# Patient Record
Sex: FEMALE | Race: WHITE | NOT HISPANIC OR LATINO
[De-identification: names, ages, dates, MRNs, and addresses within clinical notes are randomized per-mention and may not be internally consistent; named-entity substitution may affect disease eponyms.]

---

## 2021-06-02 ENCOUNTER — APPOINTMENT (OUTPATIENT)
Dept: ORTHOPEDIC SURGERY | Facility: CLINIC | Age: 21
End: 2021-06-02
Payer: COMMERCIAL

## 2021-06-02 VITALS
HEART RATE: 77 BPM | SYSTOLIC BLOOD PRESSURE: 126 MMHG | BODY MASS INDEX: 21.97 KG/M2 | DIASTOLIC BLOOD PRESSURE: 79 MMHG | HEIGHT: 63 IN | TEMPERATURE: 97.6 F | WEIGHT: 124 LBS

## 2021-06-02 DIAGNOSIS — Z78.9 OTHER SPECIFIED HEALTH STATUS: ICD-10-CM

## 2021-06-02 DIAGNOSIS — Z82.61 FAMILY HISTORY OF ARTHRITIS: ICD-10-CM

## 2021-06-02 PROCEDURE — 99072 ADDL SUPL MATRL&STAF TM PHE: CPT

## 2021-06-02 PROCEDURE — 73140 X-RAY EXAM OF FINGER(S): CPT | Mod: LT

## 2021-06-02 PROCEDURE — 99204 OFFICE O/P NEW MOD 45 MIN: CPT

## 2021-06-02 NOTE — HISTORY OF PRESENT ILLNESS
[Right] : right hand dominant [FreeTextEntry1] : She comes in today for evaluation of a left little finger fracture, which occurred 6 days ago. She jammed the finger into another player while playing basketball. She saw Dr. Hill who obtained x-rays and diagnosed her with a fracture. She was fitted with a splint, which she has removed to ice the hand. She also had an MRI which showed evidence of an enchondroma. She is having some pain and states that symptoms are worse at night after using the hand throughout the day. She rates her pain a 6 out of 10 at this time.\par \par She is accompanied by her parents today.\par \par She was referred by Dr. Mathieu Hill.

## 2021-06-02 NOTE — PHYSICAL EXAM
[de-identified] : - Constitutional: This is a female in no obvious distress.  She is accompanied by her parents today.\par - Psych: Patient is alert and oriented to person, place and time.  Patient has a normal mood and affect.\par - Cardiovascular: Normal pulses throughout the upper extremities.  No significant varicosities are noted in the upper extremities. \par - Neuro: Strength and sensation are intact throughout the upper extremities.  Patient has normal coordination.\par - Respiratory:  Patient exhibits no evidence of shortness of breath or difficulty breathing.\par - Skin: No rashes, lesions, or other abnormalities are noted in the upper extremities.\par \par ---\par  \par Examination of her left little finger after the splint was removed demonstrates swelling, tenderness and ecchymosis along the proximal phalanx.  There is limitation of motion.  There is no obvious rotational deformity.  She is neurovascularly intact distally. [de-identified] : I reviewed the MRI of her left hand from 6/1/2021.  This demonstrated what appears to be an enchondroma involving the proximal phalanx of the little finger as well as a pathologic fracture with displacement.  The case\par \par AP, lateral, and oblique radiographs of the left little finger demonstrate expansile lesion within the proximal phalanx possible one half consistent with a probable enchondroma.  There is a pathologic fracture with extension and ulnar deviation.

## 2021-06-02 NOTE — END OF VISIT
[FreeTextEntry3] : This note was written by aJnet Escudero on 06/02/2021 acting solely as a scribe for Dr. Danyel Elias.\par  \par All medical record entries made by the Scribe were at my, Dr. Danyel Elias, direction and personally dictated by me on 06/02/2021. I have personally reviewed the chart and agree that the record accurately reflects my personal performance of the history, physical exam, assessment and plan.

## 2021-06-02 NOTE — DISCUSSION/SUMMARY
[FreeTextEntry1] : She has findings consistent with what appears to be a left little finger proximal phalanx enchondroma with an associated pathologic fracture.\par \par I had a discussion regarding today's visit, the prognosis of this diagnosis, and treatment recommendations and options. At this time, I have recommended surgery.  This will consist of curettage and packing of the enchondroma with cancellous chips as well as surgical pinning of the fracture.\par \par -  The nature and purposes of the operation/procedure was discussed in detail.  I discussed the surgical procedure in detail, as well as expected postoperative recovery and outcome.\par -  Possible risks, benefits, and complications (from known and unknown causes) of the procedure were discussed in detail.  \par -  Possible non-operative alternatives to the proposed treatment were discussed in detail.  \par -  She she and her parents were told that possible risks/complications include, but are not limited to:  Infection, nerve or vessel injury, stiffness, painful scar, poor outcome, need for additional surgical procedures, and other unforeseen complications.  \par -  In addition, the possibility of an ""unsuccessful outcome,"" despite ""successful surgery,"" was discussed with she and her parents.  They understand that I cannot be certain as to the pathology he She comes in today for evaluation of this comes back, but based upon the MRI and x-rays, this appears to be an enchondroma.  They also understand the potential chance of recurrence in the future.  Finally, as there is a pathologic fracture that is displaced and requires surgical fixation, she is at risk for stiffness, which could be permanent.  I also discussed the chance of failure of the hardware as well as pin tract infection.\par -  The patient and her parents fully understand these risks and wishes to proceed.  \par -  I had a lengthy discussion with the patient and her parents regarding today's visit, the diagnosis, and my surgical treatment recommendations.  The patient has agreed to this plan of management and has expressed full understanding.  All questions were fully answered to their satisfaction. \par \par My cumulative time spent on this patient's visit included: Preparation for the visit, review of the medical records, review of pertinent diagnostic studies, examination and counseling of the patient on the above diagnosis, treatment plan and prognosis, orders of diagnostic tests, medication and/or appropriate procedures and documentation in the medical records of today's visit.

## 2021-06-02 NOTE — CONSULT LETTER
[Dear  ___] : Dear  [unfilled], [Consult Letter:] : I had the pleasure of evaluating your patient, [unfilled]. [Please see my note below.] : Please see my note below. [Consult Closing:] : Thank you very much for allowing me to participate in the care of this patient.  If you have any questions, please do not hesitate to contact me. [Sincerely,] : Sincerely, [FreeTextEntry3] : Danyel Elias M.D.\par Surgery of the Hand & Upper Extremity\par Orthopaedic Surgery\par Chief, Hand Service, [facility]\par Director, Hand Service, [facility]\par  of Orthopedic Surgery, [facility] [employer] [facility] at [facility] \par [facility] Email: [email or web address] Office Phone: [phone]

## 2021-06-02 NOTE — ADDENDUM
[FreeTextEntry1] : I, Janet Escudero, acted solely as a scribe for Dr. Elias on this date 06/02/2021.

## 2021-06-04 ENCOUNTER — OUTPATIENT (OUTPATIENT)
Dept: OUTPATIENT SERVICES | Facility: HOSPITAL | Age: 21
LOS: 1 days | End: 2021-06-04
Payer: COMMERCIAL

## 2021-06-04 VITALS
WEIGHT: 126.99 LBS | TEMPERATURE: 98 F | HEART RATE: 86 BPM | SYSTOLIC BLOOD PRESSURE: 109 MMHG | DIASTOLIC BLOOD PRESSURE: 74 MMHG | HEIGHT: 64 IN | OXYGEN SATURATION: 99 % | RESPIRATION RATE: 14 BRPM

## 2021-06-04 DIAGNOSIS — D16.12 BENIGN NEOPLASM OF SHORT BONES OF LEFT UPPER LIMB: ICD-10-CM

## 2021-06-04 DIAGNOSIS — S62.619A DISPLACED FRACTURE OF PROXIMAL PHALANX OF UNSPECIFIED FINGER, INITIAL ENCOUNTER FOR CLOSED FRACTURE: ICD-10-CM

## 2021-06-04 DIAGNOSIS — Z01.818 ENCOUNTER FOR OTHER PREPROCEDURAL EXAMINATION: ICD-10-CM

## 2021-06-04 LAB
HCG SERPL-ACNC: <1 MIU/ML — SIGNIFICANT CHANGE UP
HCT VFR BLD CALC: 41.8 % — SIGNIFICANT CHANGE UP (ref 34.5–45)
HGB BLD-MCNC: 13.8 G/DL — SIGNIFICANT CHANGE UP (ref 11.5–15.5)
MCHC RBC-ENTMCNC: 28.4 PG — SIGNIFICANT CHANGE UP (ref 27–34)
MCHC RBC-ENTMCNC: 33 GM/DL — SIGNIFICANT CHANGE UP (ref 32–36)
MCV RBC AUTO: 86 FL — SIGNIFICANT CHANGE UP (ref 80–100)
NRBC # BLD: 0 /100 WBCS — SIGNIFICANT CHANGE UP (ref 0–0)
PLATELET # BLD AUTO: 207 K/UL — SIGNIFICANT CHANGE UP (ref 150–400)
RBC # BLD: 4.86 M/UL — SIGNIFICANT CHANGE UP (ref 3.8–5.2)
RBC # FLD: 12.5 % — SIGNIFICANT CHANGE UP (ref 10.3–14.5)
WBC # BLD: 7.66 K/UL — SIGNIFICANT CHANGE UP (ref 3.8–10.5)
WBC # FLD AUTO: 7.66 K/UL — SIGNIFICANT CHANGE UP (ref 3.8–10.5)

## 2021-06-04 PROCEDURE — 36415 COLL VENOUS BLD VENIPUNCTURE: CPT

## 2021-06-04 PROCEDURE — 85027 COMPLETE CBC AUTOMATED: CPT

## 2021-06-04 PROCEDURE — G0463: CPT

## 2021-06-04 PROCEDURE — 84702 CHORIONIC GONADOTROPIN TEST: CPT

## 2021-06-04 NOTE — H&P PST ADULT - MUSCULOSKELETAL COMMENTS
proximal phalanx fracture of little finger s/p sports injury left little finger/hand  in splint wrapped in ace bandage ,cap refill good , tender on palpation

## 2021-06-04 NOTE — H&P PST ADULT - HISTORY OF PRESENT ILLNESS
This is a 21y/o female who sustained left little finger fracture s/p playing basket ball on 5/27/21 presents to PST with complaint of left little finger pain , and swelling . Evaluated by ortho  Xray showed left little finger proximal phalanx fracture . scheduled for Excision of left little finger proximal phalanx enchondroma and ORIF left liitle finger proximal  phalanx fracture on 6/8/21

## 2021-06-04 NOTE — H&P PST ADULT - NSICDXPROBLEM_GEN_ALL_CORE_FT
PROBLEM DIAGNOSES  Problem: Proximal phalanx fracture of finger  Assessment and Plan: ORIF proximal fracture of left little finger , Excision of left little finger proximal phalanx enchondroma on 6/8/21  Pre op instruction  CBC and HCG done today    COVID test pre op

## 2021-06-04 NOTE — H&P PST ADULT - NSICDXPASTMEDICALHX_GEN_ALL_CORE_FT
PAST MEDICAL HISTORY:  2019 novel coronavirus disease (COVID-19) 12/2021 with  cold symptoms    Proximal phalanx fracture of finger left little finger

## 2021-06-07 ENCOUNTER — TRANSCRIPTION ENCOUNTER (OUTPATIENT)
Age: 21
End: 2021-06-07

## 2021-06-08 ENCOUNTER — RESULT REVIEW (OUTPATIENT)
Age: 21
End: 2021-06-08

## 2021-06-08 ENCOUNTER — APPOINTMENT (OUTPATIENT)
Dept: ORTHOPEDIC SURGERY | Facility: HOSPITAL | Age: 21
End: 2021-06-08

## 2021-06-08 ENCOUNTER — OUTPATIENT (OUTPATIENT)
Dept: OUTPATIENT SERVICES | Facility: HOSPITAL | Age: 21
LOS: 1 days | End: 2021-06-08
Payer: COMMERCIAL

## 2021-06-08 VITALS
DIASTOLIC BLOOD PRESSURE: 73 MMHG | HEART RATE: 86 BPM | HEIGHT: 64 IN | WEIGHT: 124.12 LBS | RESPIRATION RATE: 18 BRPM | SYSTOLIC BLOOD PRESSURE: 141 MMHG | OXYGEN SATURATION: 100 % | TEMPERATURE: 98 F

## 2021-06-08 VITALS
HEART RATE: 102 BPM | TEMPERATURE: 98 F | OXYGEN SATURATION: 98 % | RESPIRATION RATE: 14 BRPM | SYSTOLIC BLOOD PRESSURE: 118 MMHG | DIASTOLIC BLOOD PRESSURE: 74 MMHG

## 2021-06-08 DIAGNOSIS — D16.12 BENIGN NEOPLASM OF SHORT BONES OF LEFT UPPER LIMB: ICD-10-CM

## 2021-06-08 PROCEDURE — 26210 REMOVAL OF FINGER LESION: CPT | Mod: F9

## 2021-06-08 PROCEDURE — C1713: CPT

## 2021-06-08 PROCEDURE — 26735 TREAT FINGER FRACTURE EACH: CPT | Mod: F9

## 2021-06-08 PROCEDURE — 88311 DECALCIFY TISSUE: CPT

## 2021-06-08 PROCEDURE — C1889: CPT

## 2021-06-08 PROCEDURE — 88305 TISSUE EXAM BY PATHOLOGIST: CPT | Mod: 26

## 2021-06-08 PROCEDURE — 88311 DECALCIFY TISSUE: CPT | Mod: 26

## 2021-06-08 PROCEDURE — 26735 TREAT FINGER FRACTURE EACH: CPT | Mod: F4

## 2021-06-08 PROCEDURE — 88305 TISSUE EXAM BY PATHOLOGIST: CPT

## 2021-06-08 PROCEDURE — 76000 FLUOROSCOPY <1 HR PHYS/QHP: CPT

## 2021-06-08 PROCEDURE — 26210 REMOVAL OF FINGER LESION: CPT | Mod: F4

## 2021-06-08 RX ORDER — SODIUM CHLORIDE 9 MG/ML
1000 INJECTION, SOLUTION INTRAVENOUS
Refills: 0 | Status: DISCONTINUED | OUTPATIENT
Start: 2021-06-08 | End: 2021-06-08

## 2021-06-08 RX ORDER — ACETAMINOPHEN WITH CODEINE 300MG-30MG
1 TABLET ORAL
Qty: 10 | Refills: 0
Start: 2021-06-08

## 2021-06-08 RX ORDER — OXYCODONE HYDROCHLORIDE 5 MG/1
5 TABLET ORAL ONCE
Refills: 0 | Status: DISCONTINUED | OUTPATIENT
Start: 2021-06-08 | End: 2021-06-08

## 2021-06-08 RX ORDER — CHLORHEXIDINE GLUCONATE 213 G/1000ML
1 SOLUTION TOPICAL ONCE
Refills: 0 | Status: COMPLETED | OUTPATIENT
Start: 2021-06-08 | End: 2021-06-08

## 2021-06-08 RX ORDER — ONDANSETRON 8 MG/1
4 TABLET, FILM COATED ORAL ONCE
Refills: 0 | Status: COMPLETED | OUTPATIENT
Start: 2021-06-08 | End: 2021-06-08

## 2021-06-08 RX ORDER — HYDROMORPHONE HYDROCHLORIDE 2 MG/ML
0.5 INJECTION INTRAMUSCULAR; INTRAVENOUS; SUBCUTANEOUS
Refills: 0 | Status: DISCONTINUED | OUTPATIENT
Start: 2021-06-08 | End: 2021-06-08

## 2021-06-08 RX ADMIN — ONDANSETRON 4 MILLIGRAM(S): 8 TABLET, FILM COATED ORAL at 14:12

## 2021-06-08 RX ADMIN — SODIUM CHLORIDE 75 MILLILITER(S): 9 INJECTION, SOLUTION INTRAVENOUS at 14:12

## 2021-06-08 RX ADMIN — CHLORHEXIDINE GLUCONATE 1 APPLICATION(S): 213 SOLUTION TOPICAL at 10:48

## 2021-06-08 NOTE — ASU DISCHARGE PLAN (ADULT/PEDIATRIC) - PROCEDURE
ORIF left little finger proximal phalanx and excision endochondroma ORIF left little finger proximal phalanx and excision enchondroma

## 2021-06-08 NOTE — ASU DISCHARGE PLAN (ADULT/PEDIATRIC) - BATHING
Keep splint clean and dry. Cover with plastic bag or cast cover when showering or bathing./Do not submerge in water

## 2021-06-08 NOTE — ASU PATIENT PROFILE, ADULT - PMH
2019 novel coronavirus disease (COVID-19)  12/2021 with  cold symptoms  Proximal phalanx fracture of finger  left little finger

## 2021-06-08 NOTE — BRIEF OPERATIVE NOTE - NSICDXBRIEFPROCEDURE_GEN_ALL_CORE_FT
PROCEDURES:  Excision, enchondroma, hand 08-Jun-2021 14:04:39 and ORIF left little finger proximal phalanx Tete Fontenot

## 2021-06-08 NOTE — ASU DISCHARGE PLAN (ADULT/PEDIATRIC) - CARE PROVIDER_API CALL
Danyel Elias)  Orthopaedic Surgery; Surgery of the Hand  [address], [address]  [place] [address]  Phone: [phone]  Fax: [phone]  Scheduled Appointment: 06/18/2021

## 2021-06-08 NOTE — ASU DISCHARGE PLAN (ADULT/PEDIATRIC) - ASU DC SPECIAL INSTRUCTIONSFT
Keep splint clean and dry. Sling is for comfort and may be removed at home.     Follow with Dr. Elias next Friday, June 18th.    Take clindamycin 300mg every 6 hours for 2 days as prescribed to prevent infection.

## 2021-06-09 ENCOUNTER — NON-APPOINTMENT (OUTPATIENT)
Age: 21
End: 2021-06-09

## 2021-06-09 PROBLEM — S62.619A DISPLACED FRACTURE OF PROXIMAL PHALANX OF UNSPECIFIED FINGER, INITIAL ENCOUNTER FOR CLOSED FRACTURE: Chronic | Status: ACTIVE | Noted: 2021-06-04

## 2021-06-09 PROBLEM — U07.1 COVID-19: Chronic | Status: ACTIVE | Noted: 2021-06-04

## 2021-06-10 NOTE — HISTORY OF PRESENT ILLNESS
[de-identified] : 10 days postoperative. [de-identified] : 10 days status post left little finger enchondroma curettage and packing with allograft and open duction internal fixation of pathologic left little finger proximal phalanx fracture.\par \par Final pathology was consistent with an enchondroma.\par \par She is\par \par She was accompanied by her mother today. [de-identified] : Examination of her left little finger after the splint and dressing were removed demonstrates her K wires and incision to be [de-identified] : AP, lateral and oblique radiographs of her left little finger demonstrate her proximal phalanx pathologic fracture and K wires to be [de-identified] : Stable, 10 days postoperative. [de-identified] : The sutures were removed and Steri-Strips were applied.  The K wires were clean.  She was placed into a well-padded well molded left short arm fiberglass ulnar gutter cast in the intrinsic plus position.  She was instructed on cast care and activity modification.  She will follow-up in 3 weeks.  She will follow-up before then if she is having any problems or issues.

## 2021-06-18 ENCOUNTER — APPOINTMENT (OUTPATIENT)
Dept: ORTHOPEDIC SURGERY | Facility: CLINIC | Age: 21
End: 2021-06-18
Payer: COMMERCIAL

## 2021-06-18 ENCOUNTER — NON-APPOINTMENT (OUTPATIENT)
Age: 21
End: 2021-06-18

## 2021-06-18 VITALS — TEMPERATURE: 98.7 F

## 2021-06-18 PROCEDURE — 73130 X-RAY EXAM OF HAND: CPT | Mod: LT

## 2021-06-18 PROCEDURE — 29125 APPL SHORT ARM SPLINT STATIC: CPT | Mod: LT,58

## 2021-06-18 PROCEDURE — 99024 POSTOP FOLLOW-UP VISIT: CPT

## 2021-06-18 NOTE — HISTORY OF PRESENT ILLNESS
[de-identified] : 10 days postoperative. [de-identified] : 10 days status post curettage of left little finger proximal phalanx enchondroma, packing with allograft and ORIF of pathologic fracture.\par \par She is doing well. She denies pain. \par \par She was accompanied by her mother today. [de-identified] : Examination of her left little finger after the splint and dressing were removed demonstrates her incision and K wires to be clean and dry.  There is some swelling.  There is no evidence of infection.  She is neurovascularly intact distally. [de-identified] : AP, lateral and oblique radiographs of her left little finger demonstrate her little finger proximal phalanx fracture and K wires to be well aligned. [de-identified] : Stable, 10 days postoperative. [de-identified] : The sutures were removed and Steri-Strips were applied.  The K wires were clean.  She was placed into a well-padded and well molded left short arm fiberglass ulnar gutter splint in the intrinsic plus position.  She was instructed on cast care and activity modification.  She will follow-up in 12 days.

## 2021-06-18 NOTE — END OF VISIT
[FreeTextEntry3] : This note was written by Janet Escudero on 06/18/2021 acting solely as a scribe for Dr. Danyel Elias.\par  \par All medical record entries made by the Scribe were at my, Dr. Danyel Elias, direction and personally dictated by me on 06/18/2021. I have personally reviewed the chart and agree that the record accurately reflects my personal performance of the history, physical exam, assessment and plan.

## 2021-06-30 ENCOUNTER — APPOINTMENT (OUTPATIENT)
Dept: ORTHOPEDIC SURGERY | Facility: CLINIC | Age: 21
End: 2021-06-30
Payer: COMMERCIAL

## 2021-06-30 PROCEDURE — 99024 POSTOP FOLLOW-UP VISIT: CPT

## 2021-06-30 PROCEDURE — 73140 X-RAY EXAM OF FINGER(S): CPT | Mod: LT

## 2021-06-30 NOTE — ADDENDUM
[FreeTextEntry1] : I, Janet Hansen, acted solely as a scribe for Dr. Elias on this date on 06/30/2021.

## 2021-06-30 NOTE — END OF VISIT
[FreeTextEntry3] : This note was written by Janet Hansen on 06/30/2021 acting solely as a scribe for Dr. Danyel Elias.\par  \par All medical record entries made by the Scribe were at my, Dr. Danyel Elias, direction and personally dictated by me on 06/30/2021. I have personally reviewed the chart and agree that the record accurately reflects my personal performance of the history, physical exam, assessment and plan.

## 2021-06-30 NOTE — HISTORY OF PRESENT ILLNESS
[de-identified] : 22 days postoperative. [de-identified] : 22 days status post curettage of left little finger proximal phalanx enchondroma, packing with allograft and ORIF of pathologic fracture.\par \par She is doing well. She denies pain but states the little finger feels sore. She denies fever and chills. [de-identified] : Examination of her left little finger after the splint and dressing were removed demonstrates her incision and K wires to be clean and dry.  There is decreased swelling.  There is no evidence of infection.  She is neurovascularly intact distally. [de-identified] : AP, lateral and oblique radiographs of her left little finger demonstrate her little finger proximal phalanx fracture and K wires to be well aligned.  The fracture appears to be consolidating. [de-identified] : Stable, 22 days postoperative. [de-identified] : The K wires were cleaned.  She was placed back into a well-padded and well molded left short arm fiberglass ulnar gutter splint in the intrinsic plus position.  She was instructed on cast care and activity modification.  She will follow-up in 2 weeks for possible pin removal.

## 2021-07-02 ENCOUNTER — NON-APPOINTMENT (OUTPATIENT)
Age: 21
End: 2021-07-02

## 2021-07-14 ENCOUNTER — APPOINTMENT (OUTPATIENT)
Dept: ORTHOPEDIC SURGERY | Facility: CLINIC | Age: 21
End: 2021-07-14
Payer: COMMERCIAL

## 2021-07-14 PROCEDURE — 99024 POSTOP FOLLOW-UP VISIT: CPT

## 2021-07-14 PROCEDURE — 20670 REMOVAL IMPLANT SUPERFICIAL: CPT | Mod: F4,58,LT

## 2021-07-14 PROCEDURE — 73140 X-RAY EXAM OF FINGER(S): CPT | Mod: F4

## 2021-07-14 NOTE — HISTORY OF PRESENT ILLNESS
[de-identified] : 36 days postoperative. [de-identified] : 36 days status post curettage of left little finger proximal phalanx enchondroma, packing with allograft and ORIF of pathologic fracture.\par \par She is doing well. She denies pain but states the little finger feels sore. She denies fever and chills. [de-identified] : Examination of her left little finger after the splint and dressing were removed demonstrates her incision and K wires to be clean and dry.  There is decreased swelling.  There is no evidence of infection.  She is neurovascularly intact distally. [de-identified] : AP, lateral and oblique radiographs of her left little finger demonstrate her little finger proximal phalanx fracture and K wires to be well aligned.  The fracture appears to be essentially consolidated. [de-identified] : Stable, 36 days postoperative. [de-identified] : At this time, I removed the 3 K wires.  She was instructed on niall taping and gentle range of motion exercises and scar massage and desensitization.  She was instructed on activity modification.  She will follow-up in 1 week to assess her progress.  She was referred to hand therapy to begin next week after she sees me.

## 2021-07-14 NOTE — ADDENDUM
[FreeTextEntry1] : I, Janet Hansen, acted solely as a scribe for Dr. Elias on this date on 07/14/2021.

## 2021-07-14 NOTE — END OF VISIT
[FreeTextEntry3] : This note was written by Janet Hansen on 07/14/2021 acting solely as a scribe for Dr. Danyel Elias.\par  \par All medical record entries made by the Scribe were at my, Dr. Danyel Elias, direction and personally dictated by me on 07/14/2021. I have personally reviewed the chart and agree that the record accurately reflects my personal performance of the history, physical exam, assessment and plan.

## 2021-07-21 ENCOUNTER — APPOINTMENT (OUTPATIENT)
Dept: ORTHOPEDIC SURGERY | Facility: CLINIC | Age: 21
End: 2021-07-21
Payer: COMMERCIAL

## 2021-07-21 PROCEDURE — 99024 POSTOP FOLLOW-UP VISIT: CPT

## 2021-07-21 PROCEDURE — 73140 X-RAY EXAM OF FINGER(S): CPT | Mod: LT

## 2021-07-21 NOTE — END OF VISIT
[FreeTextEntry3] : This note was written by Janet Hansen on 07/21/2021 acting solely as a scribe for Dr. Danyel Elias.\par  \par All medical record entries made by the Scribe were at my, Dr. Danyel Elias, direction and personally dictated by me on 07/21/2021. I have personally reviewed the chart and agree that the record accurately reflects my personal performance of the history, physical exam, assessment and plan.

## 2021-07-21 NOTE — HISTORY OF PRESENT ILLNESS
[de-identified] : 43 days postoperative. [de-identified] : 43 days status post curettage of left little finger proximal phalanx enchondroma, packing with allograft and ORIF of pathologic fracture.\par \par Her K wires were removed 1 week ago.\par \par She has no complaints of pain, she does however state she experiences an intermittent achiness. She denies numbness and tingling of the digits. Of note, patient will be beginning hand therapy as of this afternoon.  [de-identified] : Examination of her left little demonstrates her incision to be healing well.  There is decreased swelling.  She has full extension at the MCP joint with 80 degrees of flexion.  At the PIP joint there is full extension without active flexion.  She has approximately 30 degrees of passive PIP flexion.  She has loss of the terminal 20 degrees of DIP extension actively with full passive DIP extension.  She does have intact pull-through of the FDP tendon.  She remains neurovascularly intact distally. [de-identified] : AP, lateral and oblique radiographs of her left little finger demonstrate her little finger proximal phalanx fracture to be nearly healed.  There is slight extension on the lateral but overall acceptable alignment.   [de-identified] : Stable, 43 days postoperative. [de-identified] : She will begin hand therapy in addition to home exercise program.  She was instructed on scar massage and desensitization.  She will follow-up in 2 weeks.

## 2021-07-21 NOTE — ADDENDUM
[FreeTextEntry1] : I, Janet Hansen, acted solely as a scribe for Dr. Elias on this date on 07/21/2021.

## 2021-07-26 ENCOUNTER — NON-APPOINTMENT (OUTPATIENT)
Age: 21
End: 2021-07-26

## 2021-07-28 PROBLEM — S62.617A FRACTURE OF PROXIMAL PHALANX OF LEFT LITTLE FINGER: Status: ACTIVE | Noted: 2021-06-02

## 2021-07-28 PROBLEM — D16.12 ENCHONDROMA OF FINGER OF LEFT HAND: Status: ACTIVE | Noted: 2021-06-02

## 2021-08-04 ENCOUNTER — APPOINTMENT (OUTPATIENT)
Dept: ORTHOPEDIC SURGERY | Facility: CLINIC | Age: 21
End: 2021-08-04
Payer: COMMERCIAL

## 2021-08-04 ENCOUNTER — NON-APPOINTMENT (OUTPATIENT)
Age: 21
End: 2021-08-04

## 2021-08-04 DIAGNOSIS — S62.617A DISPLACED FRACTURE OF PROXIMAL PHALANX OF LEFT LITTLE FINGER, INITIAL ENCOUNTER FOR CLOSED FRACTURE: ICD-10-CM

## 2021-08-04 DIAGNOSIS — D16.12 BENIGN NEOPLASM OF SHORT BONES OF LEFT UPPER LIMB: ICD-10-CM

## 2021-08-04 PROCEDURE — 99024 POSTOP FOLLOW-UP VISIT: CPT

## 2021-08-04 PROCEDURE — 73140 X-RAY EXAM OF FINGER(S): CPT | Mod: LT,F4

## 2021-08-04 NOTE — HISTORY OF PRESENT ILLNESS
[de-identified] : 57 days postoperative. [de-identified] : 57 days status post curettage of left little finger proximal phalanx enchondroma, packing with allograft and ORIF of pathologic fracture.\par She states that she is significantly better following physical therapy.  She is doing\par She is in occupational therapy.\par \par She is doing well overall. She reports no pain but mild discomfort through the finger with stretching exercises. Of note, she will be returning to college in about 2 weeks time. She states she will find a hand therapist while in college to continue working on range of motion exercises of the little finger.  [de-identified] : Examination of her left little demonstrates her incision to be healing well.  There is decreased swelling.  She has full extension at the MCP joint with 80 degrees of flexion.  At the PIP joint there is full extension with 20 degrees of active flexion.  She has approximately 30 degrees of passive DIP flexion.  She has loss of the terminal 20 degrees of DIP extension actively with full passive DIP extension.  She does have intact pull-through of the FDP tendon. 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 This was not previously noted, as she had limited motion. This was discussed with the patient. She remains neurovascularly intact distally. [de-identified] : AP, lateral and oblique radiographs of her left little finger demonstrate her little finger proximal phalanx fracture to be essentially healed.  There is slight extension on the lateral but overall acceptable alignment. It appears that there likely was mild displacement of the fracture during the healing process. [de-identified] : Stable, 57 days postoperative. [de-identified] : She will continue occupational therapy in addition to home exercise program. She will begin aggressive range of motion exercises to the PIP joint. She will follow-up when she returns from college over her winter break. She told me that she will go for therapy when she is up at school and will call me when she makes an appointment to get a referral.\par \par I did discuss the malrotation of the finger and the possible need for further treatment in this regard. Finally, I discussed with her the possible need for further surgery because of the tendon adhesions. This was previously discussed with her and was also discussed with her and her mother before the initial surgery.

## 2021-08-04 NOTE — ADDENDUM
[FreeTextEntry1] : I, Janet Hansen, acted solely as a scribe for Dr. Elias on this date on 08/04/2021.

## 2021-08-04 NOTE — END OF VISIT
[FreeTextEntry3] : This note was written by Janet Hansen on 08/04/2021 acting solely as a scribe for Dr. Danyel Elias.\par  \par All medical record entries made by the Scribe were at my, Dr. Danyel Elias, direction and personally dictated by me on 08/04/2021. I have personally reviewed the chart and agree that the record accurately reflects my personal performance of the history, physical exam, assessment and plan.

## 2022-03-15 ENCOUNTER — APPOINTMENT (OUTPATIENT)
Dept: INTERNAL MEDICINE | Facility: CLINIC | Age: 22
End: 2022-03-15
Payer: COMMERCIAL

## 2022-03-15 VITALS
OXYGEN SATURATION: 98 % | DIASTOLIC BLOOD PRESSURE: 58 MMHG | TEMPERATURE: 98.7 F | SYSTOLIC BLOOD PRESSURE: 116 MMHG | WEIGHT: 125 LBS | HEIGHT: 63 IN | BODY MASS INDEX: 22.15 KG/M2 | HEART RATE: 94 BPM

## 2022-03-15 VITALS — SYSTOLIC BLOOD PRESSURE: 110 MMHG | DIASTOLIC BLOOD PRESSURE: 70 MMHG

## 2022-03-15 DIAGNOSIS — Z83.438 FAMILY HISTORY OF OTHER DISORDER OF LIPOPROTEIN METABOLISM AND OTHER LIPIDEMIA: ICD-10-CM

## 2022-03-15 DIAGNOSIS — E55.9 VITAMIN D DEFICIENCY, UNSPECIFIED: ICD-10-CM

## 2022-03-15 DIAGNOSIS — U07.1 COVID-19: ICD-10-CM

## 2022-03-15 DIAGNOSIS — Z23 ENCOUNTER FOR IMMUNIZATION: ICD-10-CM

## 2022-03-15 PROCEDURE — G0008: CPT

## 2022-03-15 PROCEDURE — G0444 DEPRESSION SCREEN ANNUAL: CPT | Mod: 59

## 2022-03-15 PROCEDURE — 90686 IIV4 VACC NO PRSV 0.5 ML IM: CPT

## 2022-03-15 PROCEDURE — 99385 PREV VISIT NEW AGE 18-39: CPT | Mod: 25

## 2022-03-15 RX ORDER — OXYCODONE AND ACETAMINOPHEN 5; 325 MG/1; MG/1
5-325 TABLET ORAL
Qty: 25 | Refills: 0 | Status: DISCONTINUED | COMMUNITY
Start: 2021-06-09 | End: 2022-03-15

## 2022-03-15 NOTE — HEALTH RISK ASSESSMENT
[Never] : Never [Yes] : Yes [2 - 4 times a month (2 pts)] : 2-4 times a month (2 points) [3 or 4 (1 pt)] : 3 or 4  (1 point) [Never (0 pts)] : Never (0 points) [No] : In the past 12 months have you used drugs other than those required for medical reasons? No [0] : 2) Feeling down, depressed, or hopeless: Not at all (0) [No falls in past year] : Patient reported no falls in the past year [PHQ-2 Negative - No further assessment needed] : PHQ-2 Negative - No further assessment needed [None] : None [Smoke Detector] : smoke detector [Carbon Monoxide Detector] : carbon monoxide detector [Seat Belt] :  uses seat belt [Sunscreen] : uses sunscreen [With Patient/Caregiver] : , with patient/caregiver [de-identified] : weight training/  cardio [de-identified] : reg [[id number]] : 0 [EyeExamDate] : /[id number] [PapSmearComments] : Appt. scheduled for 03/16/22 [AdvancecareDate] : 3/15/22

## 2022-04-04 ENCOUNTER — NON-APPOINTMENT (OUTPATIENT)
Age: 22
End: 2022-04-04

## 2023-06-27 ENCOUNTER — TRANSCRIPTION ENCOUNTER (OUTPATIENT)
Age: 23
End: 2023-06-27

## 2023-06-27 ENCOUNTER — APPOINTMENT (OUTPATIENT)
Dept: INTERNAL MEDICINE | Facility: CLINIC | Age: 23
End: 2023-06-27
Payer: COMMERCIAL

## 2023-06-27 VITALS
SYSTOLIC BLOOD PRESSURE: 106 MMHG | DIASTOLIC BLOOD PRESSURE: 60 MMHG | HEART RATE: 77 BPM | WEIGHT: 128 LBS | BODY MASS INDEX: 22.68 KG/M2 | HEIGHT: 63 IN | OXYGEN SATURATION: 99 % | TEMPERATURE: 98.6 F

## 2023-06-27 DIAGNOSIS — Z00.00 ENCOUNTER FOR GENERAL ADULT MEDICAL EXAMINATION W/OUT ABNORMAL FINDINGS: ICD-10-CM

## 2023-06-27 DIAGNOSIS — Z80.0 FAMILY HISTORY OF MALIGNANT NEOPLASM OF DIGESTIVE ORGANS: ICD-10-CM

## 2023-06-27 PROCEDURE — 90471 IMMUNIZATION ADMIN: CPT

## 2023-06-27 PROCEDURE — 99395 PREV VISIT EST AGE 18-39: CPT | Mod: 25

## 2023-06-27 PROCEDURE — 90715 TDAP VACCINE 7 YRS/> IM: CPT

## 2023-06-27 NOTE — HEALTH RISK ASSESSMENT
[2 - 4 times a month (2 pts)] : 2-4 times a month (2 points) [3 or 4 (1 pt)] : 3 or 4  (1 point) [Less than monthly (1 pt)] : Less than monthly (1 point) [Yes] : In the past 12 months have you used drugs other than those required for medical reasons? Yes [0] : 1) Little interest or pleasure doing things: Not at all (0) [1] : 2) Feeling down, depressed, or hopeless for several days (1) [Patient reported PAP Smear was normal] : Patient reported PAP Smear was normal [Never] : Never [No falls in past year] : Patient reported no falls in the past year [PHQ-2 Negative - No further assessment needed] : PHQ-2 Negative - No further assessment needed [None] : None [With Family] : lives with family [Single] : single [Fully functional (bathing, dressing, toileting, transferring, walking, feeding)] : Fully functional (bathing, dressing, toileting, transferring, walking, feeding) [Fully functional (using the telephone, shopping, preparing meals, housekeeping, doing laundry, using] : Fully functional and needs no help or supervision to perform IADLs (using the telephone, shopping, preparing meals, housekeeping, doing laundry, using transportation, managing medications and managing finances) [Smoke Detector] : smoke detector [Carbon Monoxide Detector] : carbon monoxide detector [Seat Belt] :  uses seat belt [Sunscreen] : uses sunscreen [With Patient/Caregiver] : , with patient/caregiver [de-identified] : exercises 2x per week  [de-identified] : reg [[id number]] : 1 [Change in mental status noted] : No change in mental status noted [Reports changes in hearing] : Reports no changes in hearing [PapSmearDate] : 05/23 [AdvancecareDate] : 6/27/23

## 2023-07-09 LAB
25(OH)D3 SERPL-MCNC: 21.2 NG/ML
ALBUMIN SERPL ELPH-MCNC: 4.6 G/DL
ALP BLD-CCNC: 61 U/L
ALT SERPL-CCNC: 12 U/L
AMPHET UR-MCNC: NEGATIVE NG/ML
ANION GAP SERPL CALC-SCNC: 12 MMOL/L
APPEARANCE: CLEAR
AST SERPL-CCNC: 13 U/L
BACTERIA: NEGATIVE /HPF
BARBITURATES UR-MCNC: NEGATIVE NG/ML
BENZODIAZ UR-MCNC: NEGATIVE NG/ML
BILIRUB DIRECT SERPL-MCNC: 0.1 MG/DL
BILIRUB SERPL-MCNC: 0.4 MG/DL
BILIRUBIN URINE: NEGATIVE
BLOOD URINE: NEGATIVE
BUN SERPL-MCNC: 10 MG/DL
CALCIUM SERPL-MCNC: 9.5 MG/DL
CAST: 0 /LPF
CHLORIDE SERPL-SCNC: 106 MMOL/L
CHOLEST SERPL-MCNC: 192 MG/DL
CO2 SERPL-SCNC: 21 MMOL/L
COCAINE METAB.OTHER UR-MCNC: NEGATIVE NG/ML
COLOR: YELLOW
CREAT SERPL-MCNC: 0.58 MG/DL
CREATININE, URINE: 202.4 MG/DL
EGFR: 131 ML/MIN/1.73M2
EPITHELIAL CELLS: 1 /HPF
ESTIMATED AVERAGE GLUCOSE: 94 MG/DL
FENTANYL, URINE: NEGATIVE NG/ML
FERRITIN SERPL-MCNC: 31 NG/ML
GLUCOSE QUALITATIVE U: NEGATIVE MG/DL
GLUCOSE SERPL-MCNC: 86 MG/DL
HBA1C MFR BLD HPLC: 4.9 %
HBV SURFACE AB SER QL: NONREACTIVE
HCV AB SER QL: NONREACTIVE
HCV S/CO RATIO: 0.11 S/CO
HDLC SERPL-MCNC: 74 MG/DL
IRON SERPL-MCNC: 128 UG/DL
KETONES URINE: NEGATIVE MG/DL
LDLC SERPL CALC-MCNC: 107 MG/DL
LEUKOCYTE ESTERASE URINE: ABNORMAL
M TB IFN-G BLD-IMP: NEGATIVE
METHADONE UR-MCNC: NEGATIVE NG/ML
MEV IGG FLD QL IA: >300 AU/ML
MEV IGG+IGM SER-IMP: POSITIVE
MICROSCOPIC-UA: NORMAL
MUV AB SER-ACNC: POSITIVE
MUV IGG SER QL IA: 26.6 AU/ML
NITRITE URINE: NEGATIVE
NONHDLC SERPL-MCNC: 118 MG/DL
OPIATES UR-MCNC: NEGATIVE NG/ML
OXYCODONE/OXYMORPHONE, URINE: NEGATIVE NG/ML
PCP UR-MCNC: NEGATIVE NG/ML
PH URINE: 7
PH, URINE: 6.7
PLEASE NOTE: DRUGSCRUR: NORMAL
POTASSIUM SERPL-SCNC: 4.2 MMOL/L
PROT SERPL-MCNC: 6.5 G/DL
PROTEIN URINE: NORMAL MG/DL
QUANTIFERON TB PLUS MITOGEN MINUS NIL: >10 IU/ML
QUANTIFERON TB PLUS NIL: 0.02 IU/ML
QUANTIFERON TB PLUS TB1 MINUS NIL: 0 IU/ML
QUANTIFERON TB PLUS TB2 MINUS NIL: 0 IU/ML
RED BLOOD CELLS URINE: 1 /HPF
RUBV IGG FLD-ACNC: 2.7 INDEX
RUBV IGG SER-IMP: POSITIVE
SODIUM SERPL-SCNC: 139 MMOL/L
SPECIFIC GRAVITY URINE: 1.02
T4 SERPL-MCNC: 6.2 UG/DL
THC UR QL: NEGATIVE NG/ML
TRIGL SERPL-MCNC: 53 MG/DL
TSH SERPL-ACNC: 0.93 UIU/ML
UROBILINOGEN URINE: 0.2 MG/DL
VZV AB TITR SER: POSITIVE
VZV IGG SER IF-ACNC: 2984 INDEX
WHITE BLOOD CELLS URINE: 9 /HPF

## 2023-07-10 ENCOUNTER — NON-APPOINTMENT (OUTPATIENT)
Age: 23
End: 2023-07-10

## 2023-07-11 ENCOUNTER — APPOINTMENT (OUTPATIENT)
Dept: INTERNAL MEDICINE | Facility: CLINIC | Age: 23
End: 2023-07-11
Payer: COMMERCIAL

## 2023-07-11 PROCEDURE — 90471 IMMUNIZATION ADMIN: CPT

## 2023-07-11 PROCEDURE — 90746 HEPB VACCINE 3 DOSE ADULT IM: CPT

## 2023-08-11 ENCOUNTER — APPOINTMENT (OUTPATIENT)
Dept: INTERNAL MEDICINE | Facility: CLINIC | Age: 23
End: 2023-08-11
Payer: COMMERCIAL

## 2023-08-11 PROCEDURE — 90746 HEPB VACCINE 3 DOSE ADULT IM: CPT

## 2023-08-11 PROCEDURE — G0010: CPT

## 2023-08-28 ENCOUNTER — APPOINTMENT (OUTPATIENT)
Dept: INTERNAL MEDICINE | Facility: CLINIC | Age: 23
End: 2023-08-28
Payer: COMMERCIAL

## 2023-08-28 PROCEDURE — 90686 IIV4 VACC NO PRSV 0.5 ML IM: CPT

## 2023-08-28 PROCEDURE — G0008: CPT

## 2023-10-26 NOTE — ASU PATIENT PROFILE, ADULT - HEALTH/HEALTHCARE ANXIETIES, PROFILE
none Cantharidin Counseling:  I discussed with the patient the risks of Cantharidin including but not limited to pain, redness, burning, itching, and blistering.

## 2023-11-12 ENCOUNTER — TRANSCRIPTION ENCOUNTER (OUTPATIENT)
Age: 23
End: 2023-11-12

## 2024-01-11 ENCOUNTER — APPOINTMENT (OUTPATIENT)
Dept: INTERNAL MEDICINE | Facility: CLINIC | Age: 24
End: 2024-01-11
Payer: COMMERCIAL

## 2024-01-11 PROCEDURE — 90471 IMMUNIZATION ADMIN: CPT

## 2024-01-11 PROCEDURE — 90746 HEPB VACCINE 3 DOSE ADULT IM: CPT

## 2024-04-16 DIAGNOSIS — Z11.1 ENCOUNTER FOR SCREENING FOR RESPIRATORY TUBERCULOSIS: ICD-10-CM

## 2024-04-21 LAB
M TB IFN-G BLD-IMP: NEGATIVE
QUANTIFERON TB PLUS MITOGEN MINUS NIL: >10 IU/ML
QUANTIFERON TB PLUS NIL: 0.04 IU/ML
QUANTIFERON TB PLUS TB1 MINUS NIL: 0.01 IU/ML
QUANTIFERON TB PLUS TB2 MINUS NIL: 0 IU/ML

## 2024-04-22 ENCOUNTER — TRANSCRIPTION ENCOUNTER (OUTPATIENT)
Age: 24
End: 2024-04-22

## 2024-05-13 ENCOUNTER — TRANSCRIPTION ENCOUNTER (OUTPATIENT)
Age: 24
End: 2024-05-13

## 2024-06-29 NOTE — ASU PATIENT PROFILE, ADULT - PREOP PAIN SCORE
06/28/24 2000   Patient Assessment/Suction   Level of Consciousness (AVPU) responds to voice   Respiratory Effort Unlabored   Expansion/Accessory Muscles/Retractions no retractions;no use of accessory muscles   All Lung Fields Breath Sounds Anterior:;clear   Rhythm/Pattern, Respiratory unlabored   Cough Frequency no cough   PRE-TX-O2   Device (Oxygen Therapy) room air   SpO2 (!) 93 %   Pulse Oximetry Type Intermittent   $ Pulse Oximetry - Multiple Charge Pulse Oximetry - Multiple   Pulse 97   Resp 17   Aerosol Therapy   $ Aerosol Therapy Charges PRN treatment not required   Education   $ Education 15 min;Other (see comment)        0

## 2025-02-19 ENCOUNTER — APPOINTMENT (OUTPATIENT)
Dept: INTERNAL MEDICINE | Facility: CLINIC | Age: 25
End: 2025-02-19

## 2025-07-23 ENCOUNTER — NON-APPOINTMENT (OUTPATIENT)
Age: 25
End: 2025-07-23

## 2025-07-23 ENCOUNTER — APPOINTMENT (OUTPATIENT)
Dept: PHYSICAL MEDICINE AND REHAB | Facility: CLINIC | Age: 25
End: 2025-07-23
Payer: COMMERCIAL

## 2025-07-23 DIAGNOSIS — S39.012A STRAIN OF MUSCLE, FASCIA AND TENDON OF LOWER BACK, INITIAL ENCOUNTER: ICD-10-CM

## 2025-07-23 PROCEDURE — 99203 OFFICE O/P NEW LOW 30 MIN: CPT
